# Patient Record
Sex: FEMALE | Race: WHITE | NOT HISPANIC OR LATINO | Employment: OTHER | URBAN - METROPOLITAN AREA
[De-identification: names, ages, dates, MRNs, and addresses within clinical notes are randomized per-mention and may not be internally consistent; named-entity substitution may affect disease eponyms.]

---

## 2019-10-14 PROBLEM — R00.0 TACHYCARDIA: Status: ACTIVE | Noted: 2017-07-31

## 2019-10-14 PROBLEM — G51.4 FACIAL TWITCHING: Status: ACTIVE | Noted: 2019-10-14

## 2019-10-14 PROBLEM — R06.82 TACHYPNEA: Status: ACTIVE | Noted: 2019-10-14

## 2019-10-14 PROBLEM — I10 ESSENTIAL (PRIMARY) HYPERTENSION: Status: ACTIVE | Noted: 2017-04-24

## 2019-11-30 PROBLEM — J44.1 ASTHMA EXACERBATION IN COPD: Status: ACTIVE | Noted: 2019-11-30

## 2019-11-30 PROBLEM — J45.901 ASTHMA EXACERBATION IN COPD: Status: ACTIVE | Noted: 2019-11-30

## 2019-12-01 PROBLEM — J44.1 ASTHMA EXACERBATION IN COPD: Status: RESOLVED | Noted: 2019-11-30 | Resolved: 2019-12-01

## 2019-12-01 PROBLEM — J45.51 SEVERE PERSISTENT ASTHMA WITH ACUTE EXACERBATION: Status: ACTIVE | Noted: 2019-12-01

## 2019-12-01 PROBLEM — G40.909 SEIZURE DISORDER: Status: ACTIVE | Noted: 2019-12-01

## 2019-12-01 PROBLEM — E66.811 OBESITY (BMI 30.0-34.9): Status: ACTIVE | Noted: 2019-12-01

## 2019-12-01 PROBLEM — E66.9 OBESITY (BMI 30.0-34.9): Status: ACTIVE | Noted: 2019-12-01

## 2019-12-01 PROBLEM — B09 VIRAL EXANTHEMATA: Status: ACTIVE | Noted: 2019-12-01

## 2019-12-01 PROBLEM — J14: Status: ACTIVE | Noted: 2019-12-01

## 2019-12-01 PROBLEM — R53.81 PHYSICAL DECONDITIONING: Status: ACTIVE | Noted: 2019-12-01

## 2019-12-01 PROBLEM — J45.901 ASTHMA EXACERBATION IN COPD: Status: RESOLVED | Noted: 2019-11-30 | Resolved: 2019-12-01

## 2019-12-04 PROBLEM — R21 RASH AND NONSPECIFIC SKIN ERUPTION: Status: ACTIVE | Noted: 2019-12-04

## 2019-12-04 PROBLEM — F32.A DEPRESSION: Status: ACTIVE | Noted: 2019-12-04

## 2019-12-04 PROBLEM — E78.5 HYPERLIPIDEMIA: Status: ACTIVE | Noted: 2019-12-04

## 2019-12-04 PROBLEM — J45.901 ASTHMA EXACERBATION IN COPD: Status: ACTIVE | Noted: 2019-11-19

## 2019-12-04 PROBLEM — F41.1 ANXIETY STATE: Status: ACTIVE | Noted: 2019-12-04

## 2019-12-04 PROBLEM — J96.02 ACUTE RESPIRATORY FAILURE WITH HYPERCAPNIA: Status: ACTIVE | Noted: 2019-12-04

## 2019-12-04 PROBLEM — J44.1 ASTHMA EXACERBATION IN COPD: Status: ACTIVE | Noted: 2019-11-19

## 2019-12-09 PROBLEM — J96.01 ACUTE RESPIRATORY FAILURE WITH HYPOXIA: Status: ACTIVE | Noted: 2019-12-09

## 2019-12-09 PROBLEM — Z99.81 HYPOXEMIA REQUIRING SUPPLEMENTAL OXYGEN: Status: ACTIVE | Noted: 2019-12-09

## 2019-12-09 PROBLEM — R09.02 HYPOXEMIA REQUIRING SUPPLEMENTAL OXYGEN: Status: ACTIVE | Noted: 2019-12-09

## 2019-12-09 PROBLEM — Z99.81 REQUIRES CONTINUOUS AT HOME SUPPLEMENTAL OXYGEN: Status: ACTIVE | Noted: 2019-12-09

## 2020-01-07 PROBLEM — R26.2 DIFFICULTY WALKING: Status: ACTIVE | Noted: 2020-01-07

## 2020-07-20 PROBLEM — J96.11 CHRONIC RESPIRATORY FAILURE WITH HYPOXIA: Status: ACTIVE | Noted: 2020-07-20

## 2020-08-20 PROBLEM — E11.65 TYPE 2 DIABETES MELLITUS WITH HYPERGLYCEMIA, WITH LONG-TERM CURRENT USE OF INSULIN: Status: ACTIVE | Noted: 2020-08-20

## 2020-08-20 PROBLEM — Z79.4 TYPE 2 DIABETES MELLITUS WITH HYPERGLYCEMIA, WITH LONG-TERM CURRENT USE OF INSULIN: Status: ACTIVE | Noted: 2020-08-20

## 2020-10-01 ENCOUNTER — PATIENT MESSAGE (OUTPATIENT)
Dept: ADMINISTRATIVE | Facility: OTHER | Age: 30
End: 2020-10-01

## 2020-10-01 ENCOUNTER — PATIENT MESSAGE (OUTPATIENT)
Dept: OTHER | Facility: OTHER | Age: 30
End: 2020-10-01

## 2020-10-02 DIAGNOSIS — E11.9 TYPE 2 DIABETES MELLITUS: ICD-10-CM

## 2020-12-01 ENCOUNTER — PATIENT OUTREACH (OUTPATIENT)
Dept: OTHER | Facility: OTHER | Age: 30
End: 2020-12-01

## 2020-12-01 NOTE — PROGRESS NOTES
Digital Medicine: Health  Introduction    Introduced Lyly Chadwick to Digital Medicine. Discussed health  role and recommended lifestyle modifications.    The history is provided by the patient.               HYPERTENSION  Explained hypertension digital medicine goals including BP goal less than or equal to 130/80mmHg, improved convenience of BP management and reduced risk of heart attack, kidney failure, stroke, eye disease, dementia, and death.      Explained non-pharmacologic therapies like low salt diet and physical activity can reduce blood pressure. .      Explained that we expect patient to submit several blood pressure readings per week at random times of the day, but at least 30 minutes after taking blood pressure medications. Instructed patient not to allow anyone else to use their blood pressure monitor and phone as data submitted is directly entered into medical record. Reviewed and confirmed appropriate blood pressure monitoring technique.         Patient's BP goal is less than or equal to 130/80.Patient's BP average is 122/87 mmHg, which is above goal, per 2017 ACC/AHA Hypertension Guidelines.        DIABETES  Explained the goal of the diabetes digital medicine program is to decrease A1c within patient-specific target levels. Reviewed benefits of A1c reduction including reducing risk for kidney, eye, and nerve disease.      Explained that we expect patient to submit blood sugar readings as prescribed. Instructed patient not to allow anyone else to use their glucometer and phone as data submitted is directly entered into their medical record. Reviewed and confirmed appropriate blood sugar testing technique.       Reviewed general Self-Monitoring of Blood Glucose (SMBG) goals:  · FP-130 mg/dL  · 2h PPG: < 180 mg/dL  · Bedtime: < 150 mg/dL    Reviewed signs and symptoms of hypoglycemia (weakness, dizziness, hunger, shakiness, nausea, headache, heart palpitations, sweating, fatigue,  anxiety, etc.).  Reviewed treatment of hypoglycemia (15/15 rule).      Patient's A1C goal is less than or equal to 7.  Patient's most recent A1C result is at goal.  @RESUFAST(LABA1C,HGBA).                Diet-Assessed        Intervention(s): portion control, carb reduction, reducing processed foods and DASH diet/sodium reduction education  Additional diet details: Encouraged limiting sodium intake and discussed foods high in sodium. Encouraged the use of salt free seasonings and gave examples.     Suggested 30-45g CHO for meals and 15 g for snacks.     Physical Activity-Assessed      Intervention(s): created new goal         Additional physical activity details: Encouraged 150 minutes of physical activity weekly (30 minutes five days a week).         Medication Adherence-Medication Adherence not addressed.      Substance, Sleep, Stress-Not assessed      Continue current diet/physical activity routine.  Provided patient education.  Reviewed Device Techniques.     Addressed patient questions and patient has my contact information if needed prior to next outreach. Patient verbalizes understanding.      Explained the importance of self-monitoring and medication adherence. Encouraged the patient to communicate with their health  for lifestyle modifications to help improve or maintain a healthy lifestyle.                   Topic    Eye Exam     Urine Protein Check     Lipid (Cholesterol) Test          Last 5 Patient Entered Readings                                      Current 30 Day Average: 122/87     Recent Readings 11/16/2020 11/15/2020 11/15/2020 11/14/2020 11/14/2020    SBP (mmHg) 134 146 247 109 100    DBP (mmHg) 77 81 156 78 64    Pulse 84 141 47 102 109        Last 6 Patient Entered Readings                                          Most Recent A1c:      Recent Readings 11/29/2020 11/29/2020 11/29/2020 11/29/2020 11/28/2020    Blood Glucose (mg/dL) 203 203 266 209 186

## 2020-12-01 NOTE — LETTER
December 1, 2020     Lyly Chadwick  [address] [address]       Dear Lyly,    Welcome to [email or web address]! Our goal is to make care effective, proactive and convenient by using data you send us from home to better treat your chronic conditions.                My name is Surinder Wade, and I am your dedicated Digital Medicine clinician. As an expert in medication management, I will help ensure that the medications you are taking continue to provide the intended benefits and help you reach your goals. You can reach me directly at [phone] or by sending me a message directly through your [facility] account.      I am Kimberly Higginbotham and I will be your health . My job is to help you identify lifestyle changes to improve your disease control. We will talk about nutrition, exercise, and other ways you may be able to adjust your current habits to better your health. Additionally, we will help ensure you are completing the tests and screenings that are necessary to help manage your conditions. You can reach me directly at [phone] or by sending me a message directly through your [facility] account.    Most importantly, YOU are at the center of this team. Together, we will work to improve your overall health and encourage you to meet your goals for a healthier lifestyle.     What we expect from YOU:  · Please take frequent home blood pressure measurements. We ask that you take at least 1 blood pressure reading per week, but more information will better help us get you know you. Be sure you rest for a few minutes before taking the reading in a quiet, comfortable place.    · Please take frequent home blood sugar measurements according to the frequency your physician and Digital Medicine care team specify. It is important that your team see both fasting and after meal readings.      Be available to receive phone calls or [employer] messages, when appropriate, from your care  team. Please let us know if there are any specific days or times that work best for us to reach you via phone.     Complete routine tests and screenings. Dont worry, we will help keep you on track!           What you should expect from your Digital Medicine Care Team:   We will work with you to create a personalized plan of care and provide you with encouragement and education, including regarding lifestyle changes, that could help you manage your disease states.     We will adjust your current medications, if needed, and continue to monitor your long-term progress.     We will provide you and your physician with monthly progress reports after you have been in the program for more than 30 days.     We will send you reminders through [employer] and text messages to help ensure you do not miss any testing deadlines to help manage your disease states.    You will be able to reach us by phone or through your [facility] account by clicking our names under Care Team on the right side of the home screen.    We look forward to working with you to help manage your health,    Sincerely,    Your Digital Medicine Team    Please visit our websites to learn more:   · Hypertension: [email or web address]  · Diabetes: [email or web address]      Remember, we are not available for emergencies. If you have an emergency, please contact your doctors office directly or call [facility] on-call ([phone] or [phone]) or 911.    Diabetes: We want help you get important tests and screenings done regularly to assure that your health needs are met. We have put a new system in place, called CareTouch that will help us improve how we monitor and reach out to you about the following lab tests that you will need to help manage your diabetes.  · Hemoglobin A1c testing (Frequency: Every 3 to 6 months, dependent on A1c goal)  · Nephropathy Assessment, generally urine micro albumin testing (Frequency:  Yearly)  · Eye exam through a quick 30-minute Eye Photo Exam (Frequency: 1-2 Years, depending on result)    When necessary you can come in to one of the lab locations between 10:30 am and 4:00 pm to have your tests done prior to their due date. Tell the  you received a CareTouch letter, or just look for the CareTouch sign.    For CareTouch lab locations, click here.

## 2020-12-02 ENCOUNTER — PATIENT MESSAGE (OUTPATIENT)
Dept: ADMINISTRATIVE | Facility: OTHER | Age: 30
End: 2020-12-02

## 2020-12-02 ENCOUNTER — PATIENT OUTREACH (OUTPATIENT)
Dept: OTHER | Facility: OTHER | Age: 30
End: 2020-12-02

## 2020-12-02 DIAGNOSIS — E11.65 TYPE 2 DIABETES MELLITUS WITH HYPERGLYCEMIA, WITH LONG-TERM CURRENT USE OF INSULIN: Primary | ICD-10-CM

## 2020-12-02 DIAGNOSIS — Z79.4 TYPE 2 DIABETES MELLITUS WITH HYPERGLYCEMIA, WITH LONG-TERM CURRENT USE OF INSULIN: Primary | ICD-10-CM

## 2020-12-22 ENCOUNTER — PATIENT OUTREACH (OUTPATIENT)
Dept: OTHER | Facility: OTHER | Age: 30
End: 2020-12-22

## 2020-12-22 NOTE — PROGRESS NOTES
Digital Medicine: Health  Follow-Up    The history is provided by the patient.             Reason for review: Blood glucose not at goal and Blood pressure not at goal    Patient needs assistance troubleshooting: tech support needed.      Topics Covered on Call: physical activity and Diet    Additional Follow-up details: CRM is placed for BP cuff connectivity. Patient states she hasn't been home when called. Patient states she forgot to submit BG readings recently but will do so today.            Diet-no change to diet    No change to diet.  Patient reports eating or drinking the following: Patient states she does try to limit her carbohydrates and sodium.     Encouraged aiming for 30-45 grams of carbohydrates per meal. Encouraged beginning to read food labels for nutrition content.       Physical Activity-no change to routine  No change to exercise routine.       Additional physical activity details: Patient states she walks at night, and is constantly walking the stairs at her apartment.       Medication Adherence-Medication adherence was assessed.      Substance, Sleep, Stress-Not assessed      Continue current diet/physical activity routine.  Tech support needed.       Addressed patient questions and patient has my contact information if needed prior to next outreach. Patient verbalizes understanding.      Explained the importance of self-monitoring and medication adherence. Encouraged the patient to communicate with their health  for lifestyle modifications to help improve or maintain a healthy lifestyle.                   Topic    Eye Exam     Lipid (Cholesterol) Test          Last 5 Patient Entered Readings                                      Current 30 Day Average:      Recent Readings 11/16/2020 11/15/2020 11/15/2020 11/14/2020 11/14/2020    SBP (mmHg) 134 146 247 109 100    DBP (mmHg) 77 81 156 78 64    Pulse 84 141 47 102 109        Last 6 Patient Entered Readings                                           Most Recent A1c: 6.1% on 12/9/2020  (Goal: 7%)     Recent Readings 12/9/2020 12/6/2020 12/6/2020 12/5/2020 12/4/2020    Blood Glucose (mg/dL) 272 201 237 223 186

## 2020-12-30 NOTE — PROGRESS NOTES
Digital Medicine: Clinician Introduction    Lyly Chadwick is a 30 y.o. female who is newly enrolled in the Digital Medicine Clinic.    Called patient regarding HDMP (Hypertension Digital Medicine Program) and DDMP (Diabetes Digital Medicine Program).    HPI:  Patient's last A1C was 6.1%    Patient states she has been having trouble with her blood pressure machine and it won't check her BP. She takes diltiazem 240 mg every morning, lopressor 50 mg BID, and losartan 25 mg every morning. She used to check her BP first thing in the morning.    PCP is evaluating secondary causes of HTN. Patient has an appointment with cardiology in February.     Patient also takes lantus 24 units every evening, metformin 1000 mg every morning, and novolog up to 45 units daily.     The history is provided by the patient.      Review of patient's allergies indicates:   -- Plum -- Anaphylaxis   -- Flavoring agent -- Swelling    --  Peanut butter flavor  Completed Medication Reconciliation  Verified pharmacy information.  Patient is on ACEI/ARB.   Patient is on statin     DIABETES  Explained the goal of the diabetes digital medicine program is to decrease A1c within patient-specific target levels. Reviewed benefits of A1c reduction including reducing risk for kidney, eye, and nerve disease.      Explained that we expect patient to submit blood sugar readings as prescribed. Instructed patient not to allow anyone else to use their glucometer and phone as data submitted is directly entered into their medical record. Reviewed and confirmed appropriate blood sugar testing technique.      Patient reported SMBG schedule: Three times Daily.   Reviewed general Self-Monitoring of Blood Glucose (SMBG) goals:  · FP-130 mg/dL  · 2h PPG: <180 mg/dL  · Bedtime: <150 mg/dL    Reviewed signs or symptoms of hyperglycemia (headache, increased thirst, increased urination, fatigue, blurred vision, etc.).      Reviewed signs and symptoms of hypoglycemia  (weakness, dizziness, hunger, shakiness, nausea, headache, heart palpitations, sweating, fatigue, anxiety, etc.).  Reviewed treatment of hypoglycemia (15/15 rule).      Patient does not have history of hypoglycemia.    Patient's A1C goal is less than or equal to 7 per 2020 ADA guidelines. Patient's most recent A1C result is at goal. Lab Results     Component                Value               Date                     LABA1C                   6.0                 10/14/2019               HGBA1C                   6.1                 12/09/2020          . Patient's A1C does not correlate with her home BG readings. Her A1C is much lower than her home readings. Patient also does not have anemia.      HYPERTENSION  Explained hypertension digital medicine goals including BP goal less than or equal to 130/80mmHg, improved convenience of BP management and reduced risk of heart attack, kidney failure, stroke, eye disease, dementia, and death.     Explained non-pharmacologic therapies like low salt diet and physical activity can reduce blood pressure.       Explained that we expect patient to submit several blood pressure readings per week at random times of the day, but at least 30 minutes after taking blood pressure medications. Instructed patient not to allow anyone else to use their blood pressure monitor and phone as data submitted is directly entered into medical record. Reviewed and confirmed appropriate blood pressure monitoring technique.         Reviewed signs/symptoms of hypertension (headache, changes in vision, chest pain, shortness of breath)   Reviewed signs/symptoms of hypotension (lightheaded, dizziness, weakness)     Patient's BP goal is less than or equal to 130/80. Patients BP average is Not Enough Data/Not Enough Data mmHg, which is above goal, per 2017 ACC/AHA Hypertension Guidelines.         Last 5 Patient Entered Readings                                      Current 30 Day Average:      Recent Readings  11/16/2020 11/15/2020 11/15/2020 11/14/2020 11/14/2020    SBP (mmHg) 134 146 247 109 100    DBP (mmHg) 77 81 156 78 64    Pulse 84 141 47 102 109        Last 6 Patient Entered Readings                                          Most Recent A1c: 6.1% on 12/9/2020  (Goal: 7%)     Recent Readings 12/9/2020 12/6/2020 12/6/2020 12/5/2020 12/4/2020    Blood Glucose (mg/dL) 272 201 237 223 186              Depression Screening  Lyly Chadwick screened positive on the depression screening. She is in treatment for depression and is currently taking medication Patient feels that depression symptoms are currently controlled.   Patient is on Lexapro and states it helps    Sleep Apnea Screening  Patient previously diagnosed with TANYA and is not interested in a referral at this time.     She reports she is currently using CPAP for 6 hour(s) per night. TANYA is managed effectively with CPAP use.      Medication Affordability Screening  Patient did not answer the medication affordability questionnaires. Patient is currently not having problems affording medications    Medication Adherence-Medication adherence was assessed.  Patient continue taking medication as prescribed.            ASSESSMENT(S)  Patients BP average is Not Enough Data/Not Enough Data mmHg, which is above goal. Patient's BP goal is less than or equal to 130/80.   Patient's A1C goal is less than or equal to 7. Patient's most recent A1C result is above goal. Lab Results    Component                Value               Date                     LABA1C                   6.0                 10/14/2019               HGBA1C                   6.1                 12/09/2020          .        Hypertension Plan  Additional monitoring needed. Informed patient to sign into [employer] anel for readings to transfer. She will let us know if she continues having trouble.   Continue current therapy.  Instructed to charge device.  Provided patient education. Informed patient to wait at least  an hour after her medications to check her BP and discussed BP goals.     Diabetes Plan  Additional monitoring needed.  Continue current therapy.  Provided patient education. Counseled patient on BG goals. She will take her glucometer to an [facility] to confirm accuracy of home BG readings.  Will call patient in a few weeks, sooner if needed. Patient knows to reach out at any time for any questions or concerns.        Addressed patient questions and patient has my contact information if needed prior to next outreach. Patient verbalizes understanding.      Explained the importance of self-monitoring and medication adherence. Encouraged the patient to communicate with their health  for lifestyle modifications to help improve or maintain a healthy lifestyle.        Sent link to [facility] webpages and my contact information via [employer] for future questions.        Explained to the patient that the Digital Medicine team is not available for emergencies. Advised patient call [facility] ([phone] or [phone]) or 911 if needed.                Topic    Eye Exam     Lipid (Cholesterol) Test           Current Medication Regimen:  Hypertension Medications             DILT- mg CDCR Take 240 mg by mouth once daily.    losartan (COZAAR) 25 MG tablet Take 1 tablet (25 mg total) by mouth once daily.    metoprolol tartrate (LOPRESSOR) 25 MG tablet Take 25 mg by mouth 2 (two) times daily.    metoprolol tartrate (LOPRESSOR) 50 MG tablet Take 1 tablet (50 mg total) by mouth 2 (two) times daily.        Diabetes Medications             insulin (LANTUS SOLOSTAR U-100 INSULIN) glargine 100 units/mL (3mL) SubQ pen Inject 24 Units into the skin every evening.    insulin lispro 200 unit/mL (3 mL) InPn Inject subcutaneously 3 times daily per sliding scale, max of 45 units per day.    metFORMIN (GLUCOPHAGE-XR) 500 MG ER 24hr tablet TAKE TWO TABLETS BY MOUTH EVERY DAY WITH BREAKFAST    metFORMIN (GLUMETZA)  1000 MG (MOD) 24 hr tablet Take 1,000 mg by mouth.    NOVOLOG FLEXPEN U-100 INSULIN 100 unit/mL (3 mL) InPn pen           Surinder Wade, PharmD  Digital Medicine Clinician  [phone]

## 2021-02-10 PROBLEM — K02.9 CARIES: Status: ACTIVE | Noted: 2020-09-30

## 2021-07-27 ENCOUNTER — PATIENT OUTREACH (OUTPATIENT)
Dept: ADMINISTRATIVE | Facility: HOSPITAL | Age: 31
End: 2021-07-27

## 2021-09-04 ENCOUNTER — PATIENT OUTREACH (OUTPATIENT)
Dept: ADMINISTRATIVE | Facility: HOSPITAL | Age: 31
End: 2021-09-04

## 2021-09-17 ENCOUNTER — PATIENT OUTREACH (OUTPATIENT)
Dept: ADMINISTRATIVE | Facility: HOSPITAL | Age: 31
End: 2021-09-17

## 2022-01-02 ENCOUNTER — PATIENT MESSAGE (OUTPATIENT)
Dept: OTHER | Facility: OTHER | Age: 32
End: 2022-01-02

## 2022-03-07 ENCOUNTER — PATIENT MESSAGE (OUTPATIENT)
Dept: ADMINISTRATIVE | Facility: OTHER | Age: 32
End: 2022-03-07

## 2022-03-08 ENCOUNTER — PATIENT MESSAGE (OUTPATIENT)
Dept: ADMINISTRATIVE | Facility: OTHER | Age: 32
End: 2022-03-08

## 2022-03-30 ENCOUNTER — PATIENT MESSAGE (OUTPATIENT)
Dept: ADMINISTRATIVE | Facility: OTHER | Age: 32
End: 2022-03-30

## 2022-04-26 ENCOUNTER — PATIENT MESSAGE (OUTPATIENT)
Dept: OTHER | Facility: OTHER | Age: 32
End: 2022-04-26

## 2022-05-24 PROBLEM — J44.1 ASTHMA EXACERBATION IN COPD: Status: RESOLVED | Noted: 2019-11-19 | Resolved: 2022-05-24

## 2022-05-24 PROBLEM — J14: Status: RESOLVED | Noted: 2019-12-01 | Resolved: 2022-05-24

## 2022-05-24 PROBLEM — J45.901 ASTHMA EXACERBATION IN COPD: Status: RESOLVED | Noted: 2019-11-19 | Resolved: 2022-05-24

## 2022-05-24 PROBLEM — Z99.81 REQUIRES CONTINUOUS AT HOME SUPPLEMENTAL OXYGEN: Status: RESOLVED | Noted: 2019-12-09 | Resolved: 2022-05-24

## 2022-09-21 PROBLEM — R19.7 DIARRHEA: Status: ACTIVE | Noted: 2022-09-21

## 2022-09-21 PROBLEM — R11.0 NAUSEA: Status: ACTIVE | Noted: 2022-09-21

## 2022-09-21 PROBLEM — K21.00 GASTROESOPHAGEAL REFLUX DISEASE WITH ESOPHAGITIS WITHOUT HEMORRHAGE: Status: ACTIVE | Noted: 2022-09-21

## 2022-09-30 ENCOUNTER — PATIENT OUTREACH (OUTPATIENT)
Dept: ADMINISTRATIVE | Facility: HOSPITAL | Age: 32
End: 2022-09-30

## 2023-01-15 ENCOUNTER — NURSE TRIAGE (OUTPATIENT)
Dept: ADMINISTRATIVE | Facility: CLINIC | Age: 33
End: 2023-01-15

## 2023-01-16 NOTE — TELEPHONE ENCOUNTER
Reason for Disposition   Patient sounds very sick or weak to the triager    Additional Information   Negative: [1] Major abdominal surgical incision AND [2] wound gaping open AND [3] visible internal organs   Negative: Sounds like a life-threatening emergency to the triager   Negative: [1] Bleeding from incision AND [2] won't stop after 10 minutes of direct pressure   Negative: [1] Widespread rash AND [2] bright red, sunburn-like   Negative: Severe pain in the incision   Negative: [1] Suture came out early AND [2] wound gaping AND [3] < 48 hours since sutures placed   Negative: [1] Incision gaping open AND [2] length of opening > 2 inches (5 cm)    Protocols used: Post-Op Incision Symptoms-A-  Pt reports she had GB surg 12/7 - 2 incision sites busted open and one with pus coming out. hx DM , rating pain =5. rec ED now or option to get in touch with  Pt states she has felt bad for some time and will go to ED

## 2023-06-15 ENCOUNTER — PATIENT OUTREACH (OUTPATIENT)
Dept: ADMINISTRATIVE | Facility: HOSPITAL | Age: 33
End: 2023-06-15

## 2023-06-15 DIAGNOSIS — E11.65 TYPE 2 DIABETES MELLITUS WITH HYPERGLYCEMIA, WITH LONG-TERM CURRENT USE OF INSULIN: Primary | ICD-10-CM

## 2023-06-15 DIAGNOSIS — Z79.4 TYPE 2 DIABETES MELLITUS WITH HYPERGLYCEMIA, WITH LONG-TERM CURRENT USE OF INSULIN: Primary | ICD-10-CM

## 2023-07-20 ENCOUNTER — PATIENT OUTREACH (OUTPATIENT)
Dept: ADMINISTRATIVE | Facility: HOSPITAL | Age: 33
End: 2023-07-20

## 2023-10-02 PROBLEM — R26.2 DIFFICULTY WALKING: Status: RESOLVED | Noted: 2020-01-07 | Resolved: 2023-10-02

## 2023-10-02 PROBLEM — R09.02 HYPOXEMIA REQUIRING SUPPLEMENTAL OXYGEN: Status: RESOLVED | Noted: 2019-12-09 | Resolved: 2023-10-02

## 2023-10-02 PROBLEM — Z99.81 HYPOXEMIA REQUIRING SUPPLEMENTAL OXYGEN: Status: RESOLVED | Noted: 2019-12-09 | Resolved: 2023-10-02

## 2023-10-02 PROBLEM — J45.50 SEVERE PERSISTENT ASTHMA: Status: ACTIVE | Noted: 2019-12-01

## 2023-10-02 PROBLEM — K21.9 GASTROESOPHAGEAL REFLUX DISEASE: Status: ACTIVE | Noted: 2023-10-02

## 2023-10-02 PROBLEM — K21.9 GASTROESOPHAGEAL REFLUX DISEASE: Status: RESOLVED | Noted: 2023-10-02 | Resolved: 2023-10-02

## 2023-10-02 PROBLEM — R53.81 PHYSICAL DECONDITIONING: Status: RESOLVED | Noted: 2019-12-01 | Resolved: 2023-10-02

## 2023-10-02 PROBLEM — R21 RASH AND NONSPECIFIC SKIN ERUPTION: Status: RESOLVED | Noted: 2019-12-04 | Resolved: 2023-10-02

## 2023-10-02 PROBLEM — J96.02 ACUTE RESPIRATORY FAILURE WITH HYPERCAPNIA: Status: RESOLVED | Noted: 2019-12-04 | Resolved: 2023-10-02

## 2023-10-02 PROBLEM — K86.89 PANCREATIC INSUFFICIENCY: Status: ACTIVE | Noted: 2023-10-02

## 2023-10-02 PROBLEM — J96.11 CHRONIC RESPIRATORY FAILURE WITH HYPOXIA: Status: RESOLVED | Noted: 2020-07-20 | Resolved: 2023-10-02

## 2023-10-02 PROBLEM — M25.50 ARTHRALGIA OF MULTIPLE JOINTS: Status: ACTIVE | Noted: 2023-10-02

## 2023-10-02 PROBLEM — J96.01 ACUTE RESPIRATORY FAILURE WITH HYPOXIA: Status: RESOLVED | Noted: 2019-12-09 | Resolved: 2023-10-02

## 2023-10-02 PROBLEM — G47.00 INSOMNIA: Status: ACTIVE | Noted: 2023-10-02

## 2023-10-02 PROBLEM — K02.9 CARIES: Status: RESOLVED | Noted: 2020-09-30 | Resolved: 2023-10-02

## 2023-10-02 PROBLEM — B09 VIRAL EXANTHEMATA: Status: RESOLVED | Noted: 2019-12-01 | Resolved: 2023-10-02

## 2023-10-02 PROBLEM — N39.46 MIXED STRESS AND URGE URINARY INCONTINENCE: Status: ACTIVE | Noted: 2023-10-02

## 2024-01-02 ENCOUNTER — PATIENT OUTREACH (OUTPATIENT)
Dept: ADMINISTRATIVE | Facility: HOSPITAL | Age: 34
End: 2024-01-02

## 2024-02-13 PROBLEM — R56.9 SEIZURES: Status: ACTIVE | Noted: 2024-02-13

## 2024-02-13 PROBLEM — J96.11 CHRONIC RESPIRATORY FAILURE WITH HYPOXIA, ON HOME OXYGEN THERAPY: Status: ACTIVE | Noted: 2024-02-13

## 2024-02-13 PROBLEM — I47.10 SUPRAVENTRICULAR TACHYCARDIA: Status: ACTIVE | Noted: 2024-02-13

## 2024-02-13 PROBLEM — T78.40XA ALLERGY: Status: ACTIVE | Noted: 2024-02-13

## 2024-02-13 PROBLEM — Z99.81 CHRONIC RESPIRATORY FAILURE WITH HYPOXIA, ON HOME OXYGEN THERAPY: Status: ACTIVE | Noted: 2024-02-13

## 2024-02-13 PROBLEM — F31.9 BIPOLAR DISORDER: Status: ACTIVE | Noted: 2024-02-13

## 2024-02-13 PROBLEM — J45.909 ASTHMA: Status: ACTIVE | Noted: 2024-02-13

## 2024-02-13 PROBLEM — I10 HYPERTENSION: Status: ACTIVE | Noted: 2024-02-13

## 2024-02-13 PROBLEM — F41.9 ANXIETY: Status: ACTIVE | Noted: 2024-02-13

## 2024-02-13 PROBLEM — E78.00 HYPERCHOLESTEREMIA: Status: ACTIVE | Noted: 2024-02-13

## 2024-02-13 PROBLEM — E11.9 DIABETES MELLITUS, TYPE 2: Status: ACTIVE | Noted: 2024-02-13

## 2024-02-18 PROBLEM — L42 PITYRIASIS ROSEA: Status: ACTIVE | Noted: 2024-02-18

## 2024-05-20 PROBLEM — J96.11 CHRONIC RESPIRATORY FAILURE WITH HYPOXIA, ON HOME OXYGEN THERAPY: Status: RESOLVED | Noted: 2024-02-13 | Resolved: 2024-05-20

## 2024-05-20 PROBLEM — Z99.81 CHRONIC RESPIRATORY FAILURE WITH HYPOXIA, ON HOME OXYGEN THERAPY: Status: RESOLVED | Noted: 2024-02-13 | Resolved: 2024-05-20

## 2024-06-20 PROBLEM — L42 PITYRIASIS ROSEA: Status: RESOLVED | Noted: 2024-02-18 | Resolved: 2024-06-20

## 2024-06-20 PROBLEM — I47.10 SUPRAVENTRICULAR TACHYCARDIA: Status: RESOLVED | Noted: 2024-02-13 | Resolved: 2024-06-20

## 2024-08-15 PROBLEM — J84.9 INTERSTITIAL PULMONARY DISEASE, UNSPECIFIED: Status: ACTIVE | Noted: 2024-08-15

## 2024-08-15 PROBLEM — T78.1XXA: Status: ACTIVE | Noted: 2024-08-15

## 2024-08-15 PROBLEM — D72.820 LYMPHOCYTOSIS: Status: ACTIVE | Noted: 2024-08-15

## 2024-08-15 PROBLEM — J98.4 RESTRICTIVE LUNG DISEASE: Status: ACTIVE | Noted: 2024-08-15

## 2024-08-15 PROBLEM — J45.909 SEVERE ASTHMA WITHOUT COMPLICATION: Status: ACTIVE | Noted: 2019-12-01

## 2024-08-15 PROBLEM — J30.1 SEASONAL ALLERGIC RHINITIS DUE TO POLLEN: Status: ACTIVE | Noted: 2024-08-15

## 2024-09-23 ENCOUNTER — PATIENT OUTREACH (OUTPATIENT)
Dept: ADMINISTRATIVE | Facility: HOSPITAL | Age: 34
End: 2024-09-23

## 2024-09-23 DIAGNOSIS — E11.9 TYPE 2 DIABETES MELLITUS WITHOUT COMPLICATION: ICD-10-CM

## 2024-09-25 ENCOUNTER — PATIENT OUTREACH (OUTPATIENT)
Dept: ADMINISTRATIVE | Facility: HOSPITAL | Age: 34
End: 2024-09-25

## 2024-09-25 ENCOUNTER — PATIENT MESSAGE (OUTPATIENT)
Dept: ADMINISTRATIVE | Facility: HOSPITAL | Age: 34
End: 2024-09-25

## 2024-09-25 DIAGNOSIS — E11.9 TYPE 2 DIABETES MELLITUS WITHOUT COMPLICATION, WITH LONG-TERM CURRENT USE OF INSULIN: Primary | ICD-10-CM

## 2024-09-25 DIAGNOSIS — Z79.4 TYPE 2 DIABETES MELLITUS WITHOUT COMPLICATION, WITH LONG-TERM CURRENT USE OF INSULIN: Primary | ICD-10-CM

## 2024-09-26 ENCOUNTER — PATIENT OUTREACH (OUTPATIENT)
Dept: ADMINISTRATIVE | Facility: HOSPITAL | Age: 34
End: 2024-09-26

## 2024-09-26 DIAGNOSIS — E11.9 TYPE 2 DIABETES MELLITUS WITHOUT COMPLICATION, WITH LONG-TERM CURRENT USE OF INSULIN: Primary | ICD-10-CM

## 2024-09-26 DIAGNOSIS — Z79.4 TYPE 2 DIABETES MELLITUS WITHOUT COMPLICATION, WITH LONG-TERM CURRENT USE OF INSULIN: Primary | ICD-10-CM

## 2024-10-08 ENCOUNTER — PATIENT OUTREACH (OUTPATIENT)
Dept: ADMINISTRATIVE | Facility: HOSPITAL | Age: 34
End: 2024-10-08

## 2024-10-08 DIAGNOSIS — E11.9 TYPE 2 DIABETES MELLITUS WITHOUT COMPLICATION, WITH LONG-TERM CURRENT USE OF INSULIN: Primary | ICD-10-CM

## 2024-10-08 DIAGNOSIS — Z79.4 TYPE 2 DIABETES MELLITUS WITHOUT COMPLICATION, WITH LONG-TERM CURRENT USE OF INSULIN: Primary | ICD-10-CM

## 2024-10-09 PROBLEM — G47.33 OBSTRUCTIVE SLEEP APNEA: Status: ACTIVE | Noted: 2024-10-09

## 2024-11-11 PROBLEM — E78.01 FAMILIAL HYPERCHOLESTEROLEMIA: Status: ACTIVE | Noted: 2024-11-11

## 2024-11-20 ENCOUNTER — PATIENT OUTREACH (OUTPATIENT)
Dept: ADMINISTRATIVE | Facility: OTHER | Age: 34
End: 2024-11-20

## 2024-11-20 ENCOUNTER — OUTPATIENT CASE MANAGEMENT (OUTPATIENT)
Dept: ADMINISTRATIVE | Facility: OTHER | Age: 34
End: 2024-11-20

## 2024-12-08 PROBLEM — F41.1 ANXIETY STATE: Status: ACTIVE | Noted: 2024-02-13

## 2024-12-08 PROBLEM — E11.42 DIABETIC POLYNEUROPATHY ASSOCIATED WITH TYPE 2 DIABETES MELLITUS: Status: ACTIVE | Noted: 2024-12-08

## 2024-12-08 PROBLEM — R59.0 LYMPHADENOPATHY, CERVICAL: Status: ACTIVE | Noted: 2024-12-08

## 2024-12-08 PROBLEM — M79.7 FIBROMYALGIA: Status: ACTIVE | Noted: 2024-12-08

## 2024-12-09 ENCOUNTER — PATIENT OUTREACH (OUTPATIENT)
Dept: ADMINISTRATIVE | Facility: OTHER | Age: 34
End: 2024-12-09

## 2024-12-13 ENCOUNTER — PATIENT OUTREACH (OUTPATIENT)
Dept: ADMINISTRATIVE | Facility: OTHER | Age: 34
End: 2024-12-13

## 2024-12-13 NOTE — PROGRESS NOTES
CHW - Case Closure    This Community Health Worker spoke to  pt voicemail      today.   Pt/Caregiver reported: unable to reach pt  Pt/Caregiver denied any additional needs at this time and agrees with episode closure at this time.  Provided patient with Community Health Worker's contact information and encouraged him/her to contact this Community Health Worker if additional needs arise.

## 2025-02-18 PROBLEM — E66.01 MORBID (SEVERE) OBESITY DUE TO EXCESS CALORIES: Status: ACTIVE | Noted: 2025-02-18

## 2025-03-18 PROBLEM — R10.13 EPIGASTRIC ABDOMINAL PAIN: Status: ACTIVE | Noted: 2025-03-18

## 2025-07-02 PROBLEM — D17.0 LIPOMA OF NECK: Status: ACTIVE | Noted: 2025-07-02

## 2025-08-15 ENCOUNTER — PATIENT OUTREACH (OUTPATIENT)
Dept: ADMINISTRATIVE | Facility: HOSPITAL | Age: 35
End: 2025-08-15

## 2025-08-15 DIAGNOSIS — E11.65 TYPE 2 DIABETES MELLITUS WITH HYPERGLYCEMIA, WITH LONG-TERM CURRENT USE OF INSULIN: Primary | ICD-10-CM

## 2025-08-15 DIAGNOSIS — Z79.4 TYPE 2 DIABETES MELLITUS WITH HYPERGLYCEMIA, WITH LONG-TERM CURRENT USE OF INSULIN: Primary | ICD-10-CM

## 2025-08-18 ENCOUNTER — PATIENT OUTREACH (OUTPATIENT)
Dept: ADMINISTRATIVE | Facility: HOSPITAL | Age: 35
End: 2025-08-18